# Patient Record
Sex: FEMALE | Race: WHITE
[De-identification: names, ages, dates, MRNs, and addresses within clinical notes are randomized per-mention and may not be internally consistent; named-entity substitution may affect disease eponyms.]

---

## 2019-09-09 ENCOUNTER — HOSPITAL ENCOUNTER (EMERGENCY)
Dept: HOSPITAL 33 - ED | Age: 69
Discharge: HOME | End: 2019-09-09
Payer: COMMERCIAL

## 2019-09-09 VITALS — OXYGEN SATURATION: 95 % | SYSTOLIC BLOOD PRESSURE: 133 MMHG | DIASTOLIC BLOOD PRESSURE: 74 MMHG | HEART RATE: 68 BPM

## 2019-09-09 DIAGNOSIS — I83.90: ICD-10-CM

## 2019-09-09 DIAGNOSIS — M79.89: ICD-10-CM

## 2019-09-09 DIAGNOSIS — I80.01: Primary | ICD-10-CM

## 2019-09-09 PROCEDURE — 99283 EMERGENCY DEPT VISIT LOW MDM: CPT

## 2019-09-09 PROCEDURE — 96372 THER/PROPH/DIAG INJ SC/IM: CPT

## 2019-09-09 NOTE — ERPHSYRPT
- History of Present Illness


Time Seen by Provider: 09/09/19 20:30


Source: patient, family


Exam Limitations: no limitations


Patient Subjective Stated Complaint: Swelling, Redness to right lower extremity


Triage Nursing Assessment: Patient ambulated into ED and transferred self to 

bed. Patient A+O X3. Patient's skin pink, warm and dry. Patient states she was 

seen at [facility] in [place] today for swelling, redness and warm right 

lower leg. Patient was sent to [facility] for Venous Doppler to right 

lower leg. Patient was called at home telling her to come to ER.  [facility] 

[phone] and was told they never received an official 

reading but a verbal was given that states patient has a superficial bloood 

clot in great saphris vein.  Patient's right lower leg noted to be red, warm 

and swollen. Patient complains of pain only when standing or walking. Patient 

denies SOB.


Physician History: 





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 

surgery in past on left leg presents with redness and tenderness to inner 

aspect of right calf. no cp, no soa. pt does not have a pcp. pt was seen at 

[facility] [place] in [place]. pt was sent to [facility] to 

obtain a venous doppler of right lower leg. pt was called and told to go to ED 

because of a clot present in the superficial greater saphenous vein. no 

official report but a paper that appears to be an email correspondence stating 

the above. we were unable to verify findings. additionally, [place] unable 

to send views to the cloud. no one available to obtain final results. does not 

appear to have been read.


Occurred: other (present for a couple of days)


Severity of Pain-Max: mild


Severity of Pain-Current: mild


Lower Extremities Pain: other: right (tenderness and redness in two places of 

right lower ext)


Modifying Factors: Improves With: other (tenderness on palpation)


Allergies/Adverse Reactions: 








levofloxacin [From Levaquin] Allergy (Verified 09/09/19 19:25)


 


Sulfa (Sulfonamide Antibiotics) Allergy (Verified 09/09/19 19:25)


 


sulfamethoxazole [From Bactrim] Allergy (Verified 09/09/19 19:25)


 


trimethoprim [From Bactrim] Allergy (Verified 09/09/19 19:25)


 





Hx Influenza Vaccination/Date Given: No


Hx Pneumococcal Vaccination/Date Given: No


Immunizations Up to Date: Yes





- Review of Systems


Constitutional: No Symptoms


Eyes: No Symptoms


Ears, Nose, & Throat: No Symptoms


Respiratory: No Symptoms


Cardiac: No Symptoms


Abdominal/Gastrointestinal: No Symptoms


Genitourinary Symptoms: No Symptoms


Musculoskeletal: No Symptoms


Skin: Other (superficial redness and tenderness on two sites of right medial 

calf)


Neurological: No Symptoms


Psychological: No Symptoms


Endocrine: No Symptoms


Hematologic/Lymphatic: No Symptoms


Immunological/Allergic: No Symptoms


All Other Systems: Reviewed and Negative





- Past Medical History


Pertinent Past Medical History: No


Neurological History: No Pertinent History


ENT History: No Pertinent History


Cardiac History: No Pertinent History


Respiratory History: No Pertinent History


Endocrine Medical History: No Pertinent History


Musculoskeletal History: No Pertinent History


GI Medical History: No Pertinent History


 History: No Pertinent History


Psycho-Social History: No Pertinent History


Female Reproductive Disorders: No Pertinent History





- Past Surgical History


Past Surgical History: No


Neuro Surgical History: No Pertinent History


Cardiac: Other


Respiratory: No Pertinent History


Gastrointestinal: No Pertinent History


Genitourinary: No Pertinent History


Musculoskeletal: No Pertinent History


Female Surgical History: No Pertinent History


Other Surgical History: Rasheed Varicose vein surgery





- Social History


Smoking Status: Never smoker


Exposure to second hand smoke: No


Drug Use: none


Patient Lives Alone: No





- Female History


Hx Last Menstrual Period: menopausal


Hx Pregnant Now: No





- Nursing Vital Signs


Nursing Vital Signs: 


 Initial Vital Signs











Temperature  98.3 F   09/09/19 19:30


 


Pulse Rate  76   09/09/19 19:30


 


Respiratory Rate  20   09/09/19 19:30


 


Blood Pressure  168/88   09/09/19 19:30


 


O2 Sat by Pulse Oximetry  96   09/09/19 19:30








 Pain Scale











Pain Intensity                 0

















- Physical Exam


General Appearance: no apparent distress, alert, anxiety


Eyes, Ears, Nose, Throat Exam: normal ENT inspection, moist mucous membranes


Neck Exam: normal inspection, non-tender, supple, full range of motion


Cardiovascular/Respiratory Exam: chest non-tender


Gastrointestinal/Abdominal Exam: non-tender


Back Exam: normal inspection, normal range of motion, No CVA tenderness, No 

vertebral tenderness


Hips Exam: bilateral: non-tender, normal inspection, normal range of motion, no 

evidence of injury


Legs Exam: right leg: soft tissue tenderness (redness medial aspect right calf)

, left leg: non-tender, normal inspection, normal range of motion, no evidence 

of injury


Knees Exam: bilateral knee: non-tender, normal inspection, normal range of 

motion, no evidence of injury


Ankle Exam: bilateral ankle: non-tender, normal inspection, normal range of 

motion, no evidence of injury


Foot Exam: bilateral foot: non-tender, normal inspection, normal range of motion


Neuro/Tendon Exam: normal sensation, normal motor functions, normal tendon 

functions


Mental Status Exam: alert, oriented x 3, cooperative


Skin Exam: other (see above)


**SpO2 Interpretation**: normal


SpO2: 96


O2 Delivery: Room Air


Ordered Tests: 


Medication Summary














Discontinued Medications














Generic Name Dose Route Start Last Admin





  Trade Name Michael  PRN Reason Stop Dose Admin


 


Hydrocodone Bitart/Acetaminophen  1 tab  09/09/19 20:59  09/09/19 21:10





  Norco 10/325 Mg Tablet***  PO  09/09/19 21:00  Not Given





  STAT ONE   





     





     





     





     


 


Hydrocodone Bitart/Acetaminophen  Confirm  09/09/19 21:05  





  Norco 10/325 Mg Tablet***  Administered  09/09/19 21:06  





  Dose   





  1 tab   





  .ROUTE   





  .STK-MED ONE   





     





     





     





     


 


Enoxaparin Sodium  80 mg  09/09/19 21:00  09/09/19 21:08





  Enoxaparin Sodium  SQ  09/09/19 21:01  80 mg





  STAT ONE   Administration





     





     





     





     


 


Enoxaparin Sodium  Confirm  09/09/19 21:05  





  Enoxaparin Sodium  Administered  09/09/19 21:06  





  Dose   





  80 mg   





  SQ   





  .STK-MED ONE   





     





     





     





     














- Progress


Progress: unchanged


Progress Note: 





09/09/19 21:54


i feel pt most like has a superficial thrombophlebitis. however, report given 

to patient states to go to ED in order for evaluation to maybe begin anticoag 

tx. however, clots in superficial greater saphenous veins are not typically tx 

with anticoag. since i do not have a full report or the ability to obtain study

, will tx temporarily with lovenox, have pt obtain full and final report and 

have pt follow up with dr. roberson wednesday am 9/11/19. i have d/w him and he 

agrees. 


Counseled pt/family regarding: diagnosis, need for follow-up





- Departure


Departure Disposition: Home


Clinical Impression: 


 Superficial thrombophlebitis, Greater saphenous vein embolism





Condition: Stable


Critical Care Time: No


Referrals: 


DOCTOR,NO FAMILY [Primary Care Provider] - 


Additional Instructions: 


Warm compresses to right lower leg site 2 times daily for 15 minutes not 

directly on skinfollow up tomorrow with [facility] to obtain full and final venous doppler report. give yourself lovenox 

injection as prescribed tomorrow. call dr. Roberson' office tomorrow to arrange 

for follow up appointment on Wednesday morning 9/11/19. Tell them Dr. Lange 

spoke with Dr. Roberson.


Prescriptions: 


Enoxaparin Sodium [Lovenox] 80 mg SQ BID #2 syr

## 2021-08-29 ENCOUNTER — HOSPITAL ENCOUNTER (INPATIENT)
Dept: HOSPITAL 33 - MED SURG | Age: 71
LOS: 14 days | Discharge: HOME | DRG: 179 | End: 2021-09-12
Attending: FAMILY MEDICINE | Admitting: FAMILY MEDICINE
Payer: COMMERCIAL

## 2021-08-29 DIAGNOSIS — Z79.899: ICD-10-CM

## 2021-08-29 DIAGNOSIS — R09.02: ICD-10-CM

## 2021-08-29 DIAGNOSIS — U07.1: Primary | ICD-10-CM

## 2021-08-29 DIAGNOSIS — R79.1: ICD-10-CM

## 2021-08-29 DIAGNOSIS — R53.1: ICD-10-CM

## 2021-08-29 DIAGNOSIS — J12.82: ICD-10-CM

## 2021-08-29 DIAGNOSIS — R41.0: ICD-10-CM

## 2021-08-29 DIAGNOSIS — G43.909: ICD-10-CM

## 2021-08-29 DIAGNOSIS — R53.83: ICD-10-CM

## 2021-08-29 PROCEDURE — 94668 MNPJ CHEST WALL SBSQ: CPT

## 2021-08-29 PROCEDURE — 85027 COMPLETE CBC AUTOMATED: CPT

## 2021-08-29 PROCEDURE — 71045 X-RAY EXAM CHEST 1 VIEW: CPT

## 2021-08-29 PROCEDURE — 85379 FIBRIN DEGRADATION QUANT: CPT

## 2021-08-29 PROCEDURE — 85025 COMPLETE CBC W/AUTO DIFF WBC: CPT

## 2021-08-29 PROCEDURE — 85610 PROTHROMBIN TIME: CPT

## 2021-08-29 PROCEDURE — 94667 MNPJ CHEST WALL 1ST: CPT

## 2021-08-29 PROCEDURE — 36415 COLL VENOUS BLD VENIPUNCTURE: CPT

## 2021-08-29 PROCEDURE — 80053 COMPREHEN METABOLIC PANEL: CPT

## 2021-08-29 PROCEDURE — 94762 N-INVAS EAR/PLS OXIMTRY CONT: CPT

## 2021-08-29 PROCEDURE — 80048 BASIC METABOLIC PNL TOTAL CA: CPT

## 2021-08-29 PROCEDURE — 81001 URINALYSIS AUTO W/SCOPE: CPT

## 2021-08-29 RX ADMIN — LORAZEPAM PRN MG: 1 TABLET ORAL at 23:16

## 2021-08-29 RX ADMIN — ACETAMINOPHEN PRN MG: 500 TABLET ORAL at 23:16

## 2021-08-30 LAB
HCT VFR BLD AUTO: 46.8 % (ref 35–47)
HGB BLD-MCNC: 14.9 GM/DL (ref 12–16)
MCH RBC QN AUTO: 29 PG (ref 26–32)
MCHC RBC AUTO-ENTMCNC: 31.8 G/DL (ref 32–36)
PLATELET # BLD AUTO: 307 K/MM3 (ref 150–450)
RBC # BLD AUTO: 5.13 M/MM3 (ref 4.1–5.4)
WBC # BLD AUTO: 8.2 K/MM3 (ref 4–10.5)

## 2021-08-30 RX ADMIN — INSULIN HUMAN SCH MLS/HR: 100 INJECTION, SOLUTION PARENTERAL at 09:31

## 2021-08-30 RX ADMIN — ACETAMINOPHEN PRN MG: 500 TABLET ORAL at 13:08

## 2021-08-30 RX ADMIN — LORAZEPAM PRN MG: 1 TABLET ORAL at 15:34

## 2021-08-30 RX ADMIN — ENOXAPARIN SODIUM SCH MG: 100 INJECTION SUBCUTANEOUS at 09:32

## 2021-08-30 RX ADMIN — DEXAMETHASONE SODIUM PHOSPHATE SCH MG: 10 INJECTION INTRAMUSCULAR; INTRAVENOUS at 09:31

## 2021-08-30 RX ADMIN — LORAZEPAM PRN MG: 1 TABLET ORAL at 08:14

## 2021-08-30 RX ADMIN — ACETAMINOPHEN PRN MG: 500 TABLET ORAL at 08:14

## 2021-08-30 RX ADMIN — BARICITINIB SCH: 2 TABLET, FILM COATED ORAL at 13:05

## 2021-08-30 RX ADMIN — ACETAMINOPHEN PRN MG: 500 TABLET ORAL at 22:37

## 2021-08-30 RX ADMIN — LORAZEPAM PRN MG: 1 TABLET ORAL at 22:37

## 2021-08-30 NOTE — XRAY
Indication: Positive Covid 19.



Comparison: None



Portable chest demonstrates diffuse bilateral airspace disease greatest in

right upper lobe.  Incidental right upper lobe calcified granuloma and right

hemidiaphragm elevation.  Heart not enlarged.  Bony thorax intact.

## 2021-08-30 NOTE — HP
CHIEF COMPLAINT:  Shortness of breath, headache, weakness.



HISTORY OF PRESENT ILLNESS:  The patient was transferred from [facility] in order to 
get close to home.  She had been there four days being treated with COVID drugs. She has 
not had the vaccine. She does not smoke. She has been treated with Zithromax, Rocephin, 
Dexamethasone, enoxaparin 40 mg, Remdesivir daily, Tylenol, Albuterol, Dexamethasone 6 mg 
a day, Tylenol. She was hypoxic on admission to [facility] with 80% on room air. She has 
remained hypoxic unless she has got 5 liters on by nasal cannula. 



PAST MEDICAL HISTORY:  Occasional migraine. She denies any blood clots or diabetes. She 
states she was mildly hypertensive at [facility] but is not hypertensive at home.  Her  
tested positive for COVID before she did.  

 

PHYSICAL EXAMINATION:  The patient is an appropriately aged 71 year-old white female who 
is pleasant and is in obvious pain clutching her head with a migraine.

VITAL SIGNS: Temperature 98.2F, blood pressure 142/70.  O2 saturation 92% on 5 liters. 

HEENT:  Pupils equal and reactive to light. 

NECK:  Supple. No adenopathy.

CHEST:  Few crackles bilateral. 

CVS:  Regular rate. No murmurs or gallops. 

ABDOMEN:  Soft. No masses or organomegaly. Slightly heavy. 

EXTREMITIES:  Good pulses. No edema. No cyanosis.



IMPRESSION:  The patient has COVID pneumonia, migraine headache. CT showed some nodules 5 
mm which are probably not significant which will be followed up with by her primary care 
doctor once she recovers from this. 



PROGNOSIS:  Fair.

## 2021-08-31 LAB
ALBUMIN SERPL-MCNC: 3.2 G/DL (ref 3.5–5)
ALP SERPL-CCNC: 45 U/L (ref 38–126)
ALT SERPL-CCNC: 31 U/L (ref 0–35)
ANION GAP SERPL CALC-SCNC: 9.8 MEQ/L (ref 5–15)
ANISOCYTOSIS BLD QL: (no result)
AST SERPL QL: 33 U/L (ref 14–36)
BILIRUB BLD-MCNC: 0.9 MG/DL (ref 0.2–1.3)
BUN SERPL-MCNC: 28 MG/DL (ref 7–17)
CALCIUM SPEC-MCNC: 9 MG/DL (ref 8.4–10.2)
CELLS COUNTED: 100
CHLORIDE SERPL-SCNC: 104 MMOL/L (ref 98–107)
CO2 SERPL-SCNC: 28 MMOL/L (ref 22–30)
CREAT SERPL-MCNC: 0.66 MG/DL (ref 0.52–1.04)
GFR SERPLBLD BASED ON 1.73 SQ M-ARVRAT: > 60 ML/MIN
GLUCOSE SERPL-MCNC: 113 MG/DL (ref 74–106)
HCT VFR BLD AUTO: 45.3 % (ref 35–47)
HGB BLD-MCNC: 14.4 GM/DL (ref 12–16)
MANUAL DIF COMMENT BLD-IMP: (no result)
MCH RBC QN AUTO: 29.3 PG (ref 26–32)
MCHC RBC AUTO-ENTMCNC: 31.8 G/DL (ref 32–36)
PLATELET # BLD AUTO: 362 K/MM3 (ref 150–450)
POTASSIUM SERPLBLD-SCNC: 4.4 MMOL/L (ref 3.5–5.1)
PROT SERPL-MCNC: 6.3 G/DL (ref 6.3–8.2)
RBC # BLD AUTO: 4.91 M/MM3 (ref 4.1–5.4)
SODIUM SERPL-SCNC: 137 MMOL/L (ref 137–145)
WBC # BLD AUTO: 9.6 K/MM3 (ref 4–10.5)

## 2021-08-31 RX ADMIN — ENOXAPARIN SODIUM SCH MG: 100 INJECTION SUBCUTANEOUS at 10:08

## 2021-08-31 RX ADMIN — AMLODIPINE BESYLATE SCH MG: 5 TABLET ORAL at 10:08

## 2021-08-31 RX ADMIN — INSULIN HUMAN SCH MLS/HR: 100 INJECTION, SOLUTION PARENTERAL at 10:08

## 2021-08-31 RX ADMIN — DEXAMETHASONE SODIUM PHOSPHATE SCH MG: 10 INJECTION INTRAMUSCULAR; INTRAVENOUS at 10:08

## 2021-08-31 RX ADMIN — SERTRALINE SCH MG: 50 TABLET, FILM COATED ORAL at 10:08

## 2021-08-31 RX ADMIN — BARICITINIB SCH MG: 2 TABLET, FILM COATED ORAL at 13:47

## 2021-09-01 LAB
ALBUMIN SERPL-MCNC: 3.4 G/DL (ref 3.5–5)
ALP SERPL-CCNC: 51 U/L (ref 38–126)
ALT SERPL-CCNC: 30 U/L (ref 0–35)
ANION GAP SERPL CALC-SCNC: 12 MEQ/L (ref 5–15)
AST SERPL QL: 40 U/L (ref 14–36)
BILIRUB BLD-MCNC: 0.9 MG/DL (ref 0.2–1.3)
BUN SERPL-MCNC: 33 MG/DL (ref 7–17)
CALCIUM SPEC-MCNC: 9.2 MG/DL (ref 8.4–10.2)
CHLORIDE SERPL-SCNC: 105 MMOL/L (ref 98–107)
CO2 SERPL-SCNC: 28 MMOL/L (ref 22–30)
CREAT SERPL-MCNC: 0.78 MG/DL (ref 0.52–1.04)
GFR SERPLBLD BASED ON 1.73 SQ M-ARVRAT: > 60 ML/MIN
GLUCOSE SERPL-MCNC: 116 MG/DL (ref 74–106)
INR PPP: 1.25 (ref 0.8–3)
POTASSIUM SERPLBLD-SCNC: 4.4 MMOL/L (ref 3.5–5.1)
PROT SERPL-MCNC: 6.5 G/DL (ref 6.3–8.2)
PROTHROMBIN TIME: 14.7 SECONDS (ref 9.4–12.5)
SODIUM SERPL-SCNC: 140 MMOL/L (ref 137–145)

## 2021-09-01 RX ADMIN — DEXAMETHASONE SODIUM PHOSPHATE SCH MG: 10 INJECTION INTRAMUSCULAR; INTRAVENOUS at 09:50

## 2021-09-01 RX ADMIN — SERTRALINE SCH MG: 50 TABLET, FILM COATED ORAL at 09:50

## 2021-09-01 RX ADMIN — AMLODIPINE BESYLATE SCH MG: 5 TABLET ORAL at 09:50

## 2021-09-01 RX ADMIN — BARICITINIB SCH MG: 2 TABLET, FILM COATED ORAL at 09:49

## 2021-09-01 RX ADMIN — ENOXAPARIN SODIUM SCH MG: 100 INJECTION SUBCUTANEOUS at 09:50

## 2021-09-01 RX ADMIN — INSULIN HUMAN SCH MLS/HR: 100 INJECTION, SOLUTION PARENTERAL at 09:46

## 2021-09-01 NOTE — PROG NOTE
DATE:  09/01/2021



CHIEF COMPLAINT:  Shortness of breath, confusion, achiness.



HISTORY OF PRESENT ILLNESS:  A 71-year-old white female presented to the emergency room 
probably in [place] and transferred to [facility] on 08/16/2021. She tested for COVID 
about that day. The  is positive but not real sick. She had a progressive declined 
and ended up at [facility]. I guess she was unhappy there after several days and asked 
to be transferred closer to home. Extreme fatigue. No chest pain. Denied fever or chills 
when she was admitted. She has a very little cough then or now. She was hypoxic, 80% on 
room air otherwise stable. Laboratory work was normal. Chest x-ray showed bibasilar 
infiltrates on admission at [place] and she was started on dexamethasone.  



MEDICATIONS:  At home were Albuterol PRN, Xanax 0.25 every six hours PRN anxiety, Norvasc 
5, hydralazine 10 mg t.i.d., Zoloft 50 q.d., ubidecarenone (Co Q-10). Lovenox 80 subcu 
apparently just a [facility] dose. These were continued except we adjusted her subcu 
Lovenox, held hydralazine as her blood pressure is not high and that is an ancient 
medicine with fatigue as a side effect especially in the elderly. Her Co Q-10 was also 
held. 



ALLERGIES:  AZITHROMYCIN. LEVOFLOXACIN. SULFAMETHOXAZOLE. TRIMETHOPRIM. 



REVIEW OF SYSTEMS: CONSTITUTIONAL: No fever, chills or sweats.  HEENT: No specific 
problems hearing or seeing. CVS:  Denies heart problems. PULMONARY:  Nonsmoker.  No 
abdominal pain. MUSCULOSKELETAL: Very weak, achy all over.  



LAB DATA AND TESTS: Her blood work showed her white count was normal. D-dimer was markedly 
elevated. Chest x-ray showed bilateral pneumonia, will repeat that tomorrow as it has been 
several days. D-dimer was like 1700. Albumin slightly low at 3.4. 

 

PHYSICAL EXAMINATION:  The patient speaks clearly. She has got a very tired voice. She is 
confused to place, time and who I am at first but after several days though she recognized 
me and that I took care of her mother and had more definite questions as "How is my 
" who is much better and this was after we decreased anxiety medicine of Ativan.  

VITAL SIGNS:  Pulse 90, respirations 20.  O2 saturation 90% on 15 liters this morning with 
an Oxymizer. 



IMPRESSION:  The patient has:

1) COVID pneumonia severe. She had a CT scan which showed some pulmonary nodules that are 
probably too small to be anything but granulomas and she will be followed up in one year. 

2) Possible history of hypertension. 

3) History of some delirium maybe related to disease and may be related to medication. I 
will further evaluate her as she gets well. 



PLAN:  Remdesivir, dexamethasone, Lovenox, Imitrex for migraine she is having. Olumiant 4 
mg q.d., Ativan if she gets anxious, continue her Norvasc, Xanax 0.25 PRN, Zoloft 50 q.d.



PROGNOSIS:  Fair.

## 2021-09-02 LAB
ALBUMIN SERPL-MCNC: 3.7 G/DL (ref 3.5–5)
ALP SERPL-CCNC: 53 U/L (ref 38–126)
ALT SERPL-CCNC: 33 U/L (ref 0–35)
ANION GAP SERPL CALC-SCNC: 11 MEQ/L (ref 5–15)
AST SERPL QL: 39 U/L (ref 14–36)
BASOPHILS # BLD AUTO: 0 10*3/UL (ref 0–0.4)
BASOPHILS NFR BLD AUTO: 0 % (ref 0–0.4)
BILIRUB BLD-MCNC: 1 MG/DL (ref 0.2–1.3)
BUN SERPL-MCNC: 32 MG/DL (ref 7–17)
CALCIUM SPEC-MCNC: 9.5 MG/DL (ref 8.4–10.2)
CHLORIDE SERPL-SCNC: 103 MMOL/L (ref 98–107)
CO2 SERPL-SCNC: 31 MMOL/L (ref 22–30)
CREAT SERPL-MCNC: 0.83 MG/DL (ref 0.52–1.04)
D DIMER BLD IA.RAPID-MCNC: 4055 NG/ML (ref 215–500)
EOSINOPHIL # BLD AUTO: 0 10*3/UL (ref 0–0.5)
GFR SERPLBLD BASED ON 1.73 SQ M-ARVRAT: > 60 ML/MIN
GLUCOSE SERPL-MCNC: 111 MG/DL (ref 74–106)
HCT VFR BLD AUTO: 51 % (ref 35–47)
HGB BLD-MCNC: 15.9 GM/DL (ref 12–16)
INR PPP: 1.22 (ref 0.8–3)
LYMPHOCYTES # SPEC AUTO: 0.99 10*3/UL (ref 1–4.6)
MCH RBC QN AUTO: 29.2 PG (ref 26–32)
MCHC RBC AUTO-ENTMCNC: 31.2 G/DL (ref 32–36)
MONOCYTES # BLD AUTO: 0.98 10*3/UL (ref 0–1.3)
PLATELET # BLD AUTO: 357 K/MM3 (ref 150–450)
POTASSIUM SERPLBLD-SCNC: 4.6 MMOL/L (ref 3.5–5.1)
PROT SERPL-MCNC: 7 G/DL (ref 6.3–8.2)
PROTHROMBIN TIME: 14.4 SECONDS (ref 9.4–12.5)
RBC # BLD AUTO: 5.45 M/MM3 (ref 4.1–5.4)
SODIUM SERPL-SCNC: 140 MMOL/L (ref 137–145)
WBC # BLD AUTO: 9.9 K/MM3 (ref 4–10.5)

## 2021-09-02 RX ADMIN — DEXAMETHASONE SODIUM PHOSPHATE SCH MG: 10 INJECTION INTRAMUSCULAR; INTRAVENOUS at 09:36

## 2021-09-02 RX ADMIN — AMLODIPINE BESYLATE SCH MG: 5 TABLET ORAL at 09:36

## 2021-09-02 RX ADMIN — INSULIN HUMAN SCH MLS/HR: 100 INJECTION, SOLUTION PARENTERAL at 09:38

## 2021-09-02 RX ADMIN — BARICITINIB SCH MG: 2 TABLET, FILM COATED ORAL at 09:36

## 2021-09-02 RX ADMIN — SERTRALINE SCH MG: 50 TABLET, FILM COATED ORAL at 09:36

## 2021-09-02 RX ADMIN — ENOXAPARIN SODIUM SCH MG: 100 INJECTION SUBCUTANEOUS at 09:38

## 2021-09-02 NOTE — XRAY
Indication: Short of breath.  Positive Covid 19.



Comparison: August 30, 2021.



Portable chest again demonstrates diffuse bilateral airspace disease with new

patchy consolidations greatest in both lung bases and new small left effusion.

 Again incidental right upper lobe calcified granuloma and right hemidiaphragm

elevation.  Heart not enlarged for AP portable technique.

## 2021-09-03 LAB
ALBUMIN SERPL-MCNC: 3.5 G/DL (ref 3.5–5)
ALP SERPL-CCNC: 49 U/L (ref 38–126)
ALT SERPL-CCNC: 28 U/L (ref 0–35)
ANION GAP SERPL CALC-SCNC: 9.9 MEQ/L (ref 5–15)
AST SERPL QL: 30 U/L (ref 14–36)
BASOPHILS # BLD AUTO: 0.01 10*3/UL (ref 0–0.4)
BASOPHILS NFR BLD AUTO: 0.1 % (ref 0–0.4)
BILIRUB BLD-MCNC: 1.1 MG/DL (ref 0.2–1.3)
BUN SERPL-MCNC: 31 MG/DL (ref 7–17)
CALCIUM SPEC-MCNC: 9.2 MG/DL (ref 8.4–10.2)
CHLORIDE SERPL-SCNC: 104 MMOL/L (ref 98–107)
CO2 SERPL-SCNC: 29 MMOL/L (ref 22–30)
CREAT SERPL-MCNC: 0.68 MG/DL (ref 0.52–1.04)
EOSINOPHIL # BLD AUTO: 0 10*3/UL (ref 0–0.5)
GFR SERPLBLD BASED ON 1.73 SQ M-ARVRAT: > 60 ML/MIN
GLUCOSE SERPL-MCNC: 112 MG/DL (ref 74–106)
HCT VFR BLD AUTO: 48.5 % (ref 35–47)
HGB BLD-MCNC: 15.5 GM/DL (ref 12–16)
INR PPP: 1.27 (ref 0.8–3)
LYMPHOCYTES # SPEC AUTO: 1 10*3/UL (ref 1–4.6)
MCH RBC QN AUTO: 29.4 PG (ref 26–32)
MCHC RBC AUTO-ENTMCNC: 32 G/DL (ref 32–36)
MONOCYTES # BLD AUTO: 1.02 10*3/UL (ref 0–1.3)
PLATELET # BLD AUTO: 306 K/MM3 (ref 150–450)
POTASSIUM SERPLBLD-SCNC: 4 MMOL/L (ref 3.5–5.1)
PROT SERPL-MCNC: 6.5 G/DL (ref 6.3–8.2)
PROTHROMBIN TIME: 15 SECONDS (ref 9.4–12.5)
RBC # BLD AUTO: 5.27 M/MM3 (ref 4.1–5.4)
SODIUM SERPL-SCNC: 139 MMOL/L (ref 137–145)
WBC # BLD AUTO: 8.3 K/MM3 (ref 4–10.5)

## 2021-09-03 RX ADMIN — SERTRALINE SCH MG: 50 TABLET, FILM COATED ORAL at 09:21

## 2021-09-03 RX ADMIN — ENOXAPARIN SODIUM SCH MG: 100 INJECTION SUBCUTANEOUS at 09:21

## 2021-09-03 RX ADMIN — DEXAMETHASONE SODIUM PHOSPHATE SCH MG: 10 INJECTION INTRAMUSCULAR; INTRAVENOUS at 09:21

## 2021-09-03 RX ADMIN — BARICITINIB SCH MG: 2 TABLET, FILM COATED ORAL at 09:21

## 2021-09-03 RX ADMIN — AMLODIPINE BESYLATE SCH MG: 5 TABLET ORAL at 09:21

## 2021-09-03 NOTE — PROG NOTE
CHIEF COMPLAINT:   Short of breath.



HISTORY OF PRESENT ILLNESS:  A 71 year-old white female who has been hospitalized she 
states for two weeks with COVID. She is now alert, orientated, much better than when she 
came in. Unfortunately her lungs have been pretty well devastated by the COVID. She has 
been on all her usual medications. She is still requiring high flow oxygen and not really 
improving any. She is sitting up occasionally in a chair. She is able to eat and drink 
okay, rather sleepy a lot. CMP was normal three days ago. Her D-dimer is still elevated. 
Chest few crackles. Heart sounds regular. 



LAB DATA AND TESTS: Her chest x-ray were three days ago and showed diffuse bilateral 
airspace disease with patchy consolidation in both lungs more in upper. 



IMPRESSION:  I explained to her that it going to be a couple of weeks before she can go 
home because she just cannot go home on the level of oxygen that she is on now. We cannot 
supply that plus she is weak. Her disease seems to be stable and progressive. It seems 
like she is left with the damage. Will continue with the Remdesivir, steroids. We cut her 
Ativan down due to some confusion. Blood pressure is okay without Apresoline. Zoloft will 
be continued. 



PROGNOSIS:   Fair.

## 2021-09-04 LAB
ALBUMIN SERPL-MCNC: 3.5 G/DL (ref 3.5–5)
ALP SERPL-CCNC: 49 U/L (ref 38–126)
ALT SERPL-CCNC: 25 U/L (ref 0–35)
ANION GAP SERPL CALC-SCNC: 11.8 MEQ/L (ref 5–15)
AST SERPL QL: 27 U/L (ref 14–36)
BASOPHILS # BLD AUTO: 0 10*3/UL (ref 0–0.4)
BASOPHILS NFR BLD AUTO: 0 % (ref 0–0.4)
BILIRUB BLD-MCNC: 1.3 MG/DL (ref 0.2–1.3)
BUN SERPL-MCNC: 32 MG/DL (ref 7–17)
CALCIUM SPEC-MCNC: 9.2 MG/DL (ref 8.4–10.2)
CHLORIDE SERPL-SCNC: 105 MMOL/L (ref 98–107)
CO2 SERPL-SCNC: 28 MMOL/L (ref 22–30)
CREAT SERPL-MCNC: 0.82 MG/DL (ref 0.52–1.04)
EOSINOPHIL # BLD AUTO: 0 10*3/UL (ref 0–0.5)
GFR SERPLBLD BASED ON 1.73 SQ M-ARVRAT: > 60 ML/MIN
GLUCOSE SERPL-MCNC: 99 MG/DL (ref 74–106)
HCT VFR BLD AUTO: 47.8 % (ref 35–47)
HGB BLD-MCNC: 15.4 GM/DL (ref 12–16)
INR PPP: 1.24 (ref 0.8–3)
LYMPHOCYTES # SPEC AUTO: 1.14 10*3/UL (ref 1–4.6)
MCH RBC QN AUTO: 29.4 PG (ref 26–32)
MCHC RBC AUTO-ENTMCNC: 32.2 G/DL (ref 32–36)
MONOCYTES # BLD AUTO: 1.06 10*3/UL (ref 0–1.3)
PLATELET # BLD AUTO: 310 K/MM3 (ref 150–450)
POTASSIUM SERPLBLD-SCNC: 4.1 MMOL/L (ref 3.5–5.1)
PROT SERPL-MCNC: 6.6 G/DL (ref 6.3–8.2)
PROTHROMBIN TIME: 14.6 SECONDS (ref 9.4–12.5)
RBC # BLD AUTO: 5.23 M/MM3 (ref 4.1–5.4)
SODIUM SERPL-SCNC: 141 MMOL/L (ref 137–145)
WBC # BLD AUTO: 9.4 K/MM3 (ref 4–10.5)

## 2021-09-04 RX ADMIN — BARICITINIB SCH MG: 2 TABLET, FILM COATED ORAL at 10:10

## 2021-09-04 RX ADMIN — ENOXAPARIN SODIUM SCH MG: 100 INJECTION SUBCUTANEOUS at 10:10

## 2021-09-04 RX ADMIN — SERTRALINE SCH MG: 50 TABLET, FILM COATED ORAL at 10:10

## 2021-09-04 RX ADMIN — INSULIN HUMAN SCH MLS/HR: 100 INJECTION, SOLUTION PARENTERAL at 11:02

## 2021-09-04 RX ADMIN — DEXAMETHASONE SODIUM PHOSPHATE SCH MG: 10 INJECTION INTRAMUSCULAR; INTRAVENOUS at 10:11

## 2021-09-04 RX ADMIN — AMLODIPINE BESYLATE SCH MG: 5 TABLET ORAL at 10:10

## 2021-09-04 NOTE — XRAY
Indication: Follow-up Covid 19.



Comparison: September 2, 2021.



Portable chest demonstrates grossly stable diffuse bilateral

consolidating/nonconsolidating airspace disease.  No large effusion.  Heart

not enlarged.  No new cardiopulmonary abnormalities.

## 2021-09-05 LAB
ALBUMIN SERPL-MCNC: 3.4 G/DL (ref 3.5–5)
ALP SERPL-CCNC: 48 U/L (ref 38–126)
ALT SERPL-CCNC: 21 U/L (ref 0–35)
ANION GAP SERPL CALC-SCNC: 9.6 MEQ/L (ref 5–15)
AST SERPL QL: 31 U/L (ref 14–36)
BASOPHILS # BLD AUTO: 0 10*3/UL (ref 0–0.4)
BASOPHILS NFR BLD AUTO: 0 % (ref 0–0.4)
BILIRUB BLD-MCNC: 1.3 MG/DL (ref 0.2–1.3)
BUN SERPL-MCNC: 31 MG/DL (ref 7–17)
CALCIUM SPEC-MCNC: 9 MG/DL (ref 8.4–10.2)
CHLORIDE SERPL-SCNC: 104 MMOL/L (ref 98–107)
CO2 SERPL-SCNC: 28 MMOL/L (ref 22–30)
CREAT SERPL-MCNC: 0.76 MG/DL (ref 0.52–1.04)
EOSINOPHIL # BLD AUTO: 0 10*3/UL (ref 0–0.5)
GFR SERPLBLD BASED ON 1.73 SQ M-ARVRAT: > 60 ML/MIN
GLUCOSE SERPL-MCNC: 93 MG/DL (ref 74–106)
HCT VFR BLD AUTO: 46.8 % (ref 35–47)
HGB BLD-MCNC: 15 GM/DL (ref 12–16)
INR PPP: 1.24 (ref 0.8–3)
LYMPHOCYTES # SPEC AUTO: 1.32 10*3/UL (ref 1–4.6)
MCH RBC QN AUTO: 29.7 PG (ref 26–32)
MCHC RBC AUTO-ENTMCNC: 32.1 G/DL (ref 32–36)
MONOCYTES # BLD AUTO: 1.07 10*3/UL (ref 0–1.3)
PLATELET # BLD AUTO: 284 K/MM3 (ref 150–450)
POTASSIUM SERPLBLD-SCNC: 4 MMOL/L (ref 3.5–5.1)
PROT SERPL-MCNC: 6.3 G/DL (ref 6.3–8.2)
PROTHROMBIN TIME: 14.6 SECONDS (ref 9.4–12.5)
RBC # BLD AUTO: 5.05 M/MM3 (ref 4.1–5.4)
SODIUM SERPL-SCNC: 138 MMOL/L (ref 137–145)
WBC # BLD AUTO: 9.1 K/MM3 (ref 4–10.5)

## 2021-09-05 RX ADMIN — ALPRAZOLAM SCH MG: 0.25 TABLET ORAL at 12:57

## 2021-09-05 RX ADMIN — INSULIN HUMAN SCH MLS/HR: 100 INJECTION, SOLUTION PARENTERAL at 09:24

## 2021-09-05 RX ADMIN — ENOXAPARIN SODIUM SCH MG: 100 INJECTION SUBCUTANEOUS at 09:19

## 2021-09-05 RX ADMIN — BARICITINIB SCH MG: 2 TABLET, FILM COATED ORAL at 09:19

## 2021-09-05 RX ADMIN — ALPRAZOLAM SCH MG: 0.25 TABLET ORAL at 02:04

## 2021-09-05 RX ADMIN — LORAZEPAM PRN MG: 1 TABLET ORAL at 17:27

## 2021-09-05 RX ADMIN — SERTRALINE SCH MG: 50 TABLET, FILM COATED ORAL at 09:18

## 2021-09-05 RX ADMIN — DEXAMETHASONE SODIUM PHOSPHATE SCH MG: 10 INJECTION INTRAMUSCULAR; INTRAVENOUS at 09:19

## 2021-09-05 RX ADMIN — AMLODIPINE BESYLATE SCH MG: 5 TABLET ORAL at 09:18

## 2021-09-05 RX ADMIN — ACETAMINOPHEN PRN MG: 500 TABLET ORAL at 16:29

## 2021-09-06 LAB
ALBUMIN SERPL-MCNC: 3.3 G/DL (ref 3.5–5)
ALP SERPL-CCNC: 43 U/L (ref 38–126)
ALT SERPL-CCNC: 18 U/L (ref 0–35)
ANION GAP SERPL CALC-SCNC: 9.7 MEQ/L (ref 5–15)
AST SERPL QL: 25 U/L (ref 14–36)
BASOPHILS # BLD AUTO: 0 10*3/UL (ref 0–0.4)
BASOPHILS NFR BLD AUTO: 0 % (ref 0–0.4)
BILIRUB BLD-MCNC: 1.5 MG/DL (ref 0.2–1.3)
BUN SERPL-MCNC: 31 MG/DL (ref 7–17)
CALCIUM SPEC-MCNC: 8.8 MG/DL (ref 8.4–10.2)
CHLORIDE SERPL-SCNC: 104 MMOL/L (ref 98–107)
CO2 SERPL-SCNC: 29 MMOL/L (ref 22–30)
CREAT SERPL-MCNC: 0.69 MG/DL (ref 0.52–1.04)
D DIMER BLD IA.RAPID-MCNC: 1440 NG/ML (ref 215–500)
EOSINOPHIL # BLD AUTO: 0 10*3/UL (ref 0–0.5)
GFR SERPLBLD BASED ON 1.73 SQ M-ARVRAT: > 60 ML/MIN
GLUCOSE SERPL-MCNC: 89 MG/DL (ref 74–106)
GLUCOSE UR-MCNC: NEGATIVE MG/DL
HCT VFR BLD AUTO: 46.6 % (ref 35–47)
HGB BLD-MCNC: 14.6 GM/DL (ref 12–16)
INR PPP: 1.24 (ref 0.8–3)
LYMPHOCYTES # SPEC AUTO: 1.09 10*3/UL (ref 1–4.6)
MCH RBC QN AUTO: 29.2 PG (ref 26–32)
MCHC RBC AUTO-ENTMCNC: 31.3 G/DL (ref 32–36)
MONOCYTES # BLD AUTO: 0.86 10*3/UL (ref 0–1.3)
PLATELET # BLD AUTO: 271 K/MM3 (ref 150–450)
POTASSIUM SERPLBLD-SCNC: 4.1 MMOL/L (ref 3.5–5.1)
PROT SERPL-MCNC: 6.1 G/DL (ref 6.3–8.2)
PROT UR STRIP-MCNC: NEGATIVE MG/DL
PROTHROMBIN TIME: 14.6 SECONDS (ref 9.4–12.5)
RBC # BLD AUTO: 5 M/MM3 (ref 4.1–5.4)
RBC #/AREA URNS HPF: (no result) /HPF (ref 0–2)
SODIUM SERPL-SCNC: 139 MMOL/L (ref 137–145)
WBC # BLD AUTO: 8.3 K/MM3 (ref 4–10.5)
WBC #/AREA URNS HPF: (no result) /HPF (ref 0–5)

## 2021-09-06 RX ADMIN — LORAZEPAM PRN MG: 1 TABLET ORAL at 21:32

## 2021-09-06 RX ADMIN — AMLODIPINE BESYLATE SCH MG: 5 TABLET ORAL at 10:29

## 2021-09-06 RX ADMIN — ENOXAPARIN SODIUM SCH MG: 100 INJECTION SUBCUTANEOUS at 10:29

## 2021-09-06 RX ADMIN — ALPRAZOLAM SCH MG: 0.25 TABLET ORAL at 21:30

## 2021-09-06 RX ADMIN — ACETAMINOPHEN PRN MG: 500 TABLET ORAL at 01:49

## 2021-09-06 RX ADMIN — INSULIN HUMAN SCH MLS/HR: 100 INJECTION, SOLUTION PARENTERAL at 10:29

## 2021-09-06 RX ADMIN — ALPRAZOLAM SCH MG: 0.25 TABLET ORAL at 01:49

## 2021-09-06 RX ADMIN — SERTRALINE SCH MG: 50 TABLET, FILM COATED ORAL at 10:29

## 2021-09-06 RX ADMIN — BARICITINIB SCH MG: 2 TABLET, FILM COATED ORAL at 10:29

## 2021-09-06 RX ADMIN — LORAZEPAM PRN MG: 1 TABLET ORAL at 12:54

## 2021-09-06 RX ADMIN — DEXAMETHASONE SODIUM PHOSPHATE SCH MG: 10 INJECTION INTRAMUSCULAR; INTRAVENOUS at 10:26

## 2021-09-07 LAB
ALBUMIN SERPL-MCNC: 3.1 G/DL (ref 3.5–5)
ALP SERPL-CCNC: 42 U/L (ref 38–126)
ALT SERPL-CCNC: 19 U/L (ref 0–35)
ANION GAP SERPL CALC-SCNC: 8.7 MEQ/L (ref 5–15)
AST SERPL QL: 31 U/L (ref 14–36)
BASOPHILS # BLD AUTO: 0.01 10*3/UL (ref 0–0.4)
BASOPHILS NFR BLD AUTO: 0.1 % (ref 0–0.4)
BILIRUB BLD-MCNC: 1.2 MG/DL (ref 0.2–1.3)
BUN SERPL-MCNC: 26 MG/DL (ref 7–17)
CALCIUM SPEC-MCNC: 8.6 MG/DL (ref 8.4–10.2)
CHLORIDE SERPL-SCNC: 104 MMOL/L (ref 98–107)
CO2 SERPL-SCNC: 28 MMOL/L (ref 22–30)
CREAT SERPL-MCNC: 0.62 MG/DL (ref 0.52–1.04)
D DIMER BLD IA.RAPID-MCNC: 1249 NG/ML (ref 215–500)
EOSINOPHIL # BLD AUTO: 0 10*3/UL (ref 0–0.5)
GFR SERPLBLD BASED ON 1.73 SQ M-ARVRAT: > 60 ML/MIN
GLUCOSE SERPL-MCNC: 88 MG/DL (ref 74–106)
HCT VFR BLD AUTO: 45.6 % (ref 35–47)
HGB BLD-MCNC: 14.2 GM/DL (ref 12–16)
INR PPP: 1.26 (ref 0.8–3)
LYMPHOCYTES # SPEC AUTO: 1.14 10*3/UL (ref 1–4.6)
MCH RBC QN AUTO: 28.9 PG (ref 26–32)
MCHC RBC AUTO-ENTMCNC: 31.1 G/DL (ref 32–36)
MONOCYTES # BLD AUTO: 0.73 10*3/UL (ref 0–1.3)
PLATELET # BLD AUTO: 246 K/MM3 (ref 150–450)
POTASSIUM SERPLBLD-SCNC: 4.1 MMOL/L (ref 3.5–5.1)
PROT SERPL-MCNC: 5.8 G/DL (ref 6.3–8.2)
PROTHROMBIN TIME: 14.9 SECONDS (ref 9.4–12.5)
RBC # BLD AUTO: 4.91 M/MM3 (ref 4.1–5.4)
SODIUM SERPL-SCNC: 137 MMOL/L (ref 137–145)
WBC # BLD AUTO: 7.4 K/MM3 (ref 4–10.5)

## 2021-09-07 RX ADMIN — ALPRAZOLAM PRN MG: 0.25 TABLET ORAL at 09:16

## 2021-09-07 RX ADMIN — LORAZEPAM PRN MG: 1 TABLET ORAL at 21:22

## 2021-09-07 RX ADMIN — BARICITINIB SCH MG: 2 TABLET, FILM COATED ORAL at 09:16

## 2021-09-07 RX ADMIN — INSULIN HUMAN SCH MLS/HR: 100 INJECTION, SOLUTION PARENTERAL at 09:33

## 2021-09-07 RX ADMIN — AMLODIPINE BESYLATE SCH MG: 5 TABLET ORAL at 09:16

## 2021-09-07 RX ADMIN — SERTRALINE SCH MG: 50 TABLET, FILM COATED ORAL at 09:16

## 2021-09-07 RX ADMIN — ACETAMINOPHEN PRN MG: 500 TABLET ORAL at 09:17

## 2021-09-07 RX ADMIN — DEXAMETHASONE SODIUM PHOSPHATE SCH MG: 10 INJECTION INTRAMUSCULAR; INTRAVENOUS at 09:16

## 2021-09-07 RX ADMIN — ENOXAPARIN SODIUM SCH MG: 100 INJECTION SUBCUTANEOUS at 09:15

## 2021-09-07 RX ADMIN — ACETAMINOPHEN PRN MG: 500 TABLET ORAL at 21:22

## 2021-09-08 LAB
ALBUMIN SERPL-MCNC: 3.1 G/DL (ref 3.5–5)
ALP SERPL-CCNC: 47 U/L (ref 38–126)
ALT SERPL-CCNC: 24 U/L (ref 0–35)
ANION GAP SERPL CALC-SCNC: 8.4 MEQ/L (ref 5–15)
AST SERPL QL: 28 U/L (ref 14–36)
BASOPHILS # BLD AUTO: 0 10*3/UL (ref 0–0.4)
BASOPHILS NFR BLD AUTO: 0 % (ref 0–0.4)
BILIRUB BLD-MCNC: 1.2 MG/DL (ref 0.2–1.3)
BUN SERPL-MCNC: 26 MG/DL (ref 7–17)
CALCIUM SPEC-MCNC: 8.8 MG/DL (ref 8.4–10.2)
CHLORIDE SERPL-SCNC: 103 MMOL/L (ref 98–107)
CO2 SERPL-SCNC: 29 MMOL/L (ref 22–30)
CREAT SERPL-MCNC: 0.68 MG/DL (ref 0.52–1.04)
D DIMER BLD IA.RAPID-MCNC: 1182 NG/ML (ref 215–500)
EOSINOPHIL # BLD AUTO: 0 10*3/UL (ref 0–0.5)
GFR SERPLBLD BASED ON 1.73 SQ M-ARVRAT: > 60 ML/MIN
GLUCOSE SERPL-MCNC: 98 MG/DL (ref 74–106)
HCT VFR BLD AUTO: 44.8 % (ref 35–47)
HGB BLD-MCNC: 14.3 GM/DL (ref 12–16)
INR PPP: 1.21 (ref 0.8–3)
LYMPHOCYTES # SPEC AUTO: 1.19 10*3/UL (ref 1–4.6)
MCH RBC QN AUTO: 29.4 PG (ref 26–32)
MCHC RBC AUTO-ENTMCNC: 31.9 G/DL (ref 32–36)
MONOCYTES # BLD AUTO: 0.82 10*3/UL (ref 0–1.3)
PLATELET # BLD AUTO: 229 K/MM3 (ref 150–450)
POTASSIUM SERPLBLD-SCNC: 3.9 MMOL/L (ref 3.5–5.1)
PROT SERPL-MCNC: 5.9 G/DL (ref 6.3–8.2)
PROTHROMBIN TIME: 14.3 SECONDS (ref 9.4–12.5)
RBC # BLD AUTO: 4.86 M/MM3 (ref 4.1–5.4)
SODIUM SERPL-SCNC: 137 MMOL/L (ref 137–145)
WBC # BLD AUTO: 7.7 K/MM3 (ref 4–10.5)

## 2021-09-08 RX ADMIN — ACETAMINOPHEN PRN MG: 500 TABLET ORAL at 08:59

## 2021-09-08 RX ADMIN — ENOXAPARIN SODIUM SCH MG: 100 INJECTION SUBCUTANEOUS at 08:58

## 2021-09-08 RX ADMIN — DEXAMETHASONE SODIUM PHOSPHATE SCH MG: 10 INJECTION INTRAMUSCULAR; INTRAVENOUS at 08:58

## 2021-09-08 RX ADMIN — SERTRALINE SCH MG: 50 TABLET, FILM COATED ORAL at 08:59

## 2021-09-08 RX ADMIN — BARICITINIB SCH MG: 2 TABLET, FILM COATED ORAL at 08:58

## 2021-09-08 RX ADMIN — INSULIN HUMAN SCH MLS/HR: 100 INJECTION, SOLUTION PARENTERAL at 09:57

## 2021-09-08 RX ADMIN — AMLODIPINE BESYLATE SCH MG: 5 TABLET ORAL at 08:59

## 2021-09-08 RX ADMIN — ALPRAZOLAM PRN MG: 0.25 TABLET ORAL at 08:59

## 2021-09-09 LAB
ALBUMIN SERPL-MCNC: 3.1 G/DL (ref 3.5–5)
ALP SERPL-CCNC: 46 U/L (ref 38–126)
ALT SERPL-CCNC: 22 U/L (ref 0–35)
ANION GAP SERPL CALC-SCNC: 9 MEQ/L (ref 5–15)
AST SERPL QL: 41 U/L (ref 14–36)
BILIRUB BLD-MCNC: 1.1 MG/DL (ref 0.2–1.3)
BUN SERPL-MCNC: 25 MG/DL (ref 7–17)
CALCIUM SPEC-MCNC: 8.8 MG/DL (ref 8.4–10.2)
CHLORIDE SERPL-SCNC: 103 MMOL/L (ref 98–107)
CO2 SERPL-SCNC: 29 MMOL/L (ref 22–30)
CREAT SERPL-MCNC: 0.64 MG/DL (ref 0.52–1.04)
GFR SERPLBLD BASED ON 1.73 SQ M-ARVRAT: > 60 ML/MIN
GLUCOSE SERPL-MCNC: 89 MG/DL (ref 74–106)
HCT VFR BLD AUTO: 44.7 % (ref 35–47)
HGB BLD-MCNC: 14.3 GM/DL (ref 12–16)
MCH RBC QN AUTO: 29.3 PG (ref 26–32)
MCHC RBC AUTO-ENTMCNC: 32 G/DL (ref 32–36)
PLATELET # BLD AUTO: 213 K/MM3 (ref 150–450)
POTASSIUM SERPLBLD-SCNC: 3.9 MMOL/L (ref 3.5–5.1)
PROT SERPL-MCNC: 5.8 G/DL (ref 6.3–8.2)
RBC # BLD AUTO: 4.88 M/MM3 (ref 4.1–5.4)
SODIUM SERPL-SCNC: 137 MMOL/L (ref 137–145)
WBC # BLD AUTO: 14.1 K/MM3 (ref 4–10.5)

## 2021-09-09 RX ADMIN — ENOXAPARIN SODIUM SCH MG: 100 INJECTION SUBCUTANEOUS at 09:03

## 2021-09-09 RX ADMIN — DEXAMETHASONE SODIUM PHOSPHATE SCH MG: 10 INJECTION INTRAMUSCULAR; INTRAVENOUS at 09:04

## 2021-09-09 RX ADMIN — SERTRALINE SCH MG: 50 TABLET, FILM COATED ORAL at 09:04

## 2021-09-09 RX ADMIN — ACETAMINOPHEN PRN MG: 500 TABLET ORAL at 00:10

## 2021-09-09 RX ADMIN — AMLODIPINE BESYLATE SCH MG: 5 TABLET ORAL at 09:04

## 2021-09-09 RX ADMIN — BARICITINIB SCH MG: 2 TABLET, FILM COATED ORAL at 09:04

## 2021-09-09 RX ADMIN — LORAZEPAM PRN MG: 1 TABLET ORAL at 00:10

## 2021-09-10 LAB
ANION GAP SERPL CALC-SCNC: 7.3 MEQ/L (ref 5–15)
BUN SERPL-MCNC: 26 MG/DL (ref 7–17)
CALCIUM SPEC-MCNC: 8.5 MG/DL (ref 8.4–10.2)
CHLORIDE SERPL-SCNC: 104 MMOL/L (ref 98–107)
CO2 SERPL-SCNC: 29 MMOL/L (ref 22–30)
CREAT SERPL-MCNC: 0.72 MG/DL (ref 0.52–1.04)
GFR SERPLBLD BASED ON 1.73 SQ M-ARVRAT: > 60 ML/MIN
GLUCOSE SERPL-MCNC: 94 MG/DL (ref 74–106)
HCT VFR BLD AUTO: 42 % (ref 35–47)
HGB BLD-MCNC: 13.2 GM/DL (ref 12–16)
MCH RBC QN AUTO: 29.1 PG (ref 26–32)
MCHC RBC AUTO-ENTMCNC: 31.4 G/DL (ref 32–36)
PLATELET # BLD AUTO: 213 K/MM3 (ref 150–450)
POTASSIUM SERPLBLD-SCNC: 4 MMOL/L (ref 3.5–5.1)
RBC # BLD AUTO: 4.54 M/MM3 (ref 4.1–5.4)
SODIUM SERPL-SCNC: 136 MMOL/L (ref 137–145)
WBC # BLD AUTO: 11 K/MM3 (ref 4–10.5)

## 2021-09-10 RX ADMIN — AMLODIPINE BESYLATE SCH MG: 5 TABLET ORAL at 10:46

## 2021-09-10 RX ADMIN — DEXAMETHASONE SODIUM PHOSPHATE SCH MG: 10 INJECTION INTRAMUSCULAR; INTRAVENOUS at 10:46

## 2021-09-10 RX ADMIN — SERTRALINE SCH MG: 50 TABLET, FILM COATED ORAL at 10:46

## 2021-09-10 RX ADMIN — ALPRAZOLAM PRN MG: 0.25 TABLET ORAL at 23:17

## 2021-09-10 RX ADMIN — BARICITINIB SCH MG: 2 TABLET, FILM COATED ORAL at 10:46

## 2021-09-10 RX ADMIN — ENOXAPARIN SODIUM SCH MG: 100 INJECTION SUBCUTANEOUS at 10:46

## 2021-09-10 RX ADMIN — BISACODYL PRN MG: 5 TABLET, COATED ORAL at 12:58

## 2021-09-10 NOTE — XRAY
Indication: Positive Covid 19.



Comparison: September 4, 2021.



Portable chest again demonstrates diffuse bilateral airspace disease, little

improved in the right lung and unchanged in the left.  Heart and mediastinal

structures within normal limits.  Chronic right hemidiaphragm elevation.  No

new cardiopulmonary abnormalities.

## 2021-09-11 LAB
ALBUMIN SERPL-MCNC: 3 G/DL (ref 3.5–5)
ALP SERPL-CCNC: 41 U/L (ref 38–126)
ALT SERPL-CCNC: 19 U/L (ref 0–35)
ANION GAP SERPL CALC-SCNC: 8.5 MEQ/L (ref 5–15)
AST SERPL QL: 31 U/L (ref 14–36)
BASOPHILS # BLD AUTO: 0 10*3/UL (ref 0–0.4)
BASOPHILS NFR BLD AUTO: 0 % (ref 0–0.4)
BILIRUB BLD-MCNC: 1.1 MG/DL (ref 0.2–1.3)
BUN SERPL-MCNC: 24 MG/DL (ref 7–17)
CALCIUM SPEC-MCNC: 8.7 MG/DL (ref 8.4–10.2)
CHLORIDE SERPL-SCNC: 104 MMOL/L (ref 98–107)
CO2 SERPL-SCNC: 29 MMOL/L (ref 22–30)
CREAT SERPL-MCNC: 0.64 MG/DL (ref 0.52–1.04)
EOSINOPHIL # BLD AUTO: 0 10*3/UL (ref 0–0.5)
GFR SERPLBLD BASED ON 1.73 SQ M-ARVRAT: > 60 ML/MIN
GLUCOSE SERPL-MCNC: 97 MG/DL (ref 74–106)
HCT VFR BLD AUTO: 42.1 % (ref 35–47)
HGB BLD-MCNC: 13.3 GM/DL (ref 12–16)
LYMPHOCYTES # SPEC AUTO: 0.84 10*3/UL (ref 1–4.6)
MCH RBC QN AUTO: 29.3 PG (ref 26–32)
MCHC RBC AUTO-ENTMCNC: 31.6 G/DL (ref 32–36)
MONOCYTES # BLD AUTO: 0.75 10*3/UL (ref 0–1.3)
PLATELET # BLD AUTO: 219 K/MM3 (ref 150–450)
POTASSIUM SERPLBLD-SCNC: 4.2 MMOL/L (ref 3.5–5.1)
PROT SERPL-MCNC: 5.6 G/DL (ref 6.3–8.2)
RBC # BLD AUTO: 4.54 M/MM3 (ref 4.1–5.4)
SODIUM SERPL-SCNC: 137 MMOL/L (ref 137–145)
WBC # BLD AUTO: 9.1 K/MM3 (ref 4–10.5)

## 2021-09-11 RX ADMIN — ACETAMINOPHEN PRN MG: 500 TABLET ORAL at 17:13

## 2021-09-11 RX ADMIN — ENOXAPARIN SODIUM SCH MG: 100 INJECTION SUBCUTANEOUS at 09:31

## 2021-09-11 RX ADMIN — ALPRAZOLAM PRN MG: 0.25 TABLET ORAL at 21:02

## 2021-09-11 RX ADMIN — DEXAMETHASONE SODIUM PHOSPHATE SCH MG: 10 INJECTION INTRAMUSCULAR; INTRAVENOUS at 09:31

## 2021-09-11 RX ADMIN — BISACODYL PRN MG: 5 TABLET, COATED ORAL at 09:33

## 2021-09-11 RX ADMIN — BARICITINIB SCH MG: 2 TABLET, FILM COATED ORAL at 09:32

## 2021-09-11 RX ADMIN — AMLODIPINE BESYLATE SCH MG: 5 TABLET ORAL at 09:32

## 2021-09-11 RX ADMIN — SERTRALINE SCH MG: 50 TABLET, FILM COATED ORAL at 09:32

## 2021-09-12 VITALS — DIASTOLIC BLOOD PRESSURE: 70 MMHG | SYSTOLIC BLOOD PRESSURE: 120 MMHG

## 2021-09-12 VITALS — HEART RATE: 77 BPM | OXYGEN SATURATION: 99 %

## 2021-09-12 RX ADMIN — AMLODIPINE BESYLATE SCH MG: 5 TABLET ORAL at 11:18

## 2021-09-12 RX ADMIN — ENOXAPARIN SODIUM SCH MG: 100 INJECTION SUBCUTANEOUS at 11:17

## 2021-09-12 RX ADMIN — SERTRALINE SCH MG: 50 TABLET, FILM COATED ORAL at 11:18

## 2021-09-12 RX ADMIN — BARICITINIB SCH MG: 2 TABLET, FILM COATED ORAL at 11:18

## 2021-09-12 RX ADMIN — DEXAMETHASONE SODIUM PHOSPHATE SCH MG: 10 INJECTION INTRAMUSCULAR; INTRAVENOUS at 11:18

## 2021-09-13 NOTE — PROG NOTE
DATE:  09/11/2021 



HISTORY:  The patient is extremely demanding to go home. She is on 10 liters by mask which 
would be difficult to take care of her at home. She is able to sit up and walk a little 
bit. She has been COVID positive for a long time. She has been here approximately 20 days 
and she was at [facility] for several days before then so she may well be COVID 
negative. The family does not want her to go home as Hospice will not see her if she is 
COVID positive and she certainly probably would survive this if she continued to be 
treated although she may be on oxygen for the rest of her life. The plan at this time 
would be to decrease her O2 from 10 to 5 and see if she tolerates the feeling of hypoxia 
or maybe she will not be hypoxic. We could probably let her go home and will probably have 
to let her go home if she tolerates the 5 liters. I will talk to her in an hour or so.



She is mentally alert, orientated. Her D-dimer unfortunately is still elevated at 941 
which is down from earlier when it was like 12,000.

## 2021-09-16 NOTE — DS
ADMISSION DIAGNOSES:

1) COVID pneumonia. 

2) Mild delirium. 

3) Severe hypoxia. 



DISCHARGE DIAGNOSES:

1) COVID PNEUMONIA. 

2) MILD DELIRIUM. 

3) SEVERE HYPOXIA. 



 

HOSPITAL COURSE:  The patient is a 71 year-old white female who was transferred from [facility], desired to be closer to home.  When admitted she was somewhat confused. I have 
never seen the patient before. She was very short of breath. She was hypoxic and needed 5 
liters of oxygen initially. Her D-dimer was markedly elevated. Her chest x-ray showed 
bilateral COVID with significant infiltrates. She had some delirium which was worse at 
night which did go away before she was discharged. Her D-dimer was high as 1,700 and 
gradually came down. Her O2's took a long time with 15 liters. Chest x-ray really remained 
about the same. D-dimer on 09/07/2021 was still elevated at 1,300 which correlated with 
her symptoms, still very significant and short of breath on oxygen. D-dimer on 09/02/2021 
was 4,000. She was feeling somewhat better. She was very depressed and upset about being 
here. She is very upset about prolonged stay although with her oxygen just did not allow 
her to go home. I did some bartering to make sure she could stay. I am sure she would not 
be able to stay home on 5 liters as she will need BiPAP.  The last few days before 
discharge she was improved. Finally, I felt it was safe to send her home on 09/12/2021 on 
prednisone 40 mg x5 days, 20 mg x5 days. Her O2 was on 3 liters. She is to continue 
Albuterol 2 puffs every 4 hours PRN, Xanax 0.25 h.s. PRN, Norvasc 5 q.d. She is off of her 
Lovenox. She is off of hydrochlorothiazide and put her on some lisinopril 20. She will 
continue on her Zoloft 50, ubidecarenone 100 mg q.d. for chronic chest pain. She has an 
Oximeter. She is to call if the O2 drops below 90, follow up with her own physician in two 
to three weeks, stay isolated for another week. She has really been ill with this for 
about a month. She would no longer be contagious. 

 

PROGNOSIS:  Felt to be good.